# Patient Record
Sex: FEMALE | Race: AMERICAN INDIAN OR ALASKA NATIVE
[De-identification: names, ages, dates, MRNs, and addresses within clinical notes are randomized per-mention and may not be internally consistent; named-entity substitution may affect disease eponyms.]

---

## 2017-08-14 ENCOUNTER — HOSPITAL ENCOUNTER (EMERGENCY)
Dept: HOSPITAL 43 - DL.ED | Age: 52
Discharge: HOME | End: 2017-08-14
Payer: MEDICAID

## 2017-08-14 VITALS — SYSTOLIC BLOOD PRESSURE: 97 MMHG | DIASTOLIC BLOOD PRESSURE: 66 MMHG

## 2017-08-14 DIAGNOSIS — S60.221A: Primary | ICD-10-CM

## 2017-08-14 DIAGNOSIS — E11.9: ICD-10-CM

## 2017-08-14 DIAGNOSIS — Z79.899: ICD-10-CM

## 2017-08-14 DIAGNOSIS — S80.211A: ICD-10-CM

## 2017-08-14 DIAGNOSIS — Z79.84: ICD-10-CM

## 2017-08-14 DIAGNOSIS — Z90.49: ICD-10-CM

## 2017-08-14 DIAGNOSIS — Z98.890: ICD-10-CM

## 2017-08-14 DIAGNOSIS — F17.210: ICD-10-CM
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
## 2017-08-14 PROCEDURE — 99284 EMERGENCY DEPT VISIT MOD MDM: CPT

## 2017-08-14 PROCEDURE — 73130 X-RAY EXAM OF HAND: CPT

## 2017-08-14 NOTE — CR
Clinical history: 51-year-old female injured right hand (fall).

 

Interpretation: Chronic arthritic degenerative changes involving the first metacarpophalangeal and a
ll interphalangeal/DIP joints.

 

No sign of right hand fracture or dislocation but faint transverse lucent line across the "waist" of
 the carpal navicular bone that could represent occult nondisplaced fracture. Point tenderness anato
tamara snuffbox? No other fracture/dislocation of the wrist.

 

CONCLUSION: Subtle observation navicular bone left wrist (see above). No fracture or dislocation holt
dDev Osteoarthritis.

## 2017-08-14 NOTE — EDM.PDOC
ED HPI GENERAL MEDICAL PROBLEM





- General


Chief Complaint: Upper Extremity Injury/Pain


Stated Complaint: [id number] FELL IN ALLEY


Time Seen by Provider: 08/14/17 16:09


Source of Information: Reports: Patient


History Limitations: Reports: No Limitations





- History of Present Illness


INITIAL COMMENTS - FREE TEXT/NARRATIVE: 





This 52 yo female patient reports to the ED with pain in her right hand over 

the 3-5 MCP joints and some left sided posterior neck pain. The patient reports 

she was walking in an alley when she tripped and fell. The patient reports no 

loss of consciousness before, during or after the fall. The patient reports she 

has been attempting to rest and ice the area, but she continues to be unable to 

make a fist. The patient also has an abrasion to the right knee. 


Onset: Today


Duration: Hour(s):, Constant


Location: Reports: Neck (left lateral posterior neck), Upper Extremity, Right (3

-5 MCP), Lower Extremity, Right (knee abrsion)


Quality: Reports: Ache, Dull


Severity: Moderate


Improves with: Reports: None


Worsens with: Reports: None


Context: Reports: Other (fall)


Associated Symptoms: Reports: No Other Symptoms


  ** Right Hand


Pain Score (Numeric/FACES): 8





- Related Data


 Allergies











Allergy/AdvReac Type Severity Reaction Status Date / Time


 


No Known Allergies Allergy   Verified 06/05/15 16:24











Home Meds: 


 Home Meds





Calcium Carbonate [Calcium] 1 tab PO DAILY 06/05/15 [History]


Lisinopril 2.5 mg PO DAILY 06/05/15 [History]


metFORMIN [Glucophage] 1,000 mg PO BID 06/05/15 [History]











Past Medical History


HEENT History: Reports: None


Cardiovascular History: Reports: None


Respiratory History: Reports: None


Gastrointestinal History: Reports: None


Genitourinary History: Reports: None


OB/GYN History: Reports: None


Neurological History: Reports: None


Psychiatric History: Reports: Depression


Endocrine/Metabolic History: Reports: Diabetes, Type II


Hematologic History: Reports: None


Immunologic History: Reports: None


Oncologic (Cancer) History: Reports: None


Dermatologic History: Reports: None





- Infectious Disease History


Infectious Disease History: Reports: None





- Past Surgical History


HEENT Surgical History: Reports: None


Cardiovascular Surgical History: Reports: None


Respiratory Surgical History: Reports: None


GI Surgical History: Reports: Cholecystectomy


Female  Surgical History: Reports: None


Endocrine Surgical History: Reports: None


Neurological Surgical History: Reports: None


Musculoskeletal Surgical History: Reports: Other (See Below)


Other Musculoskeletal Surgeries/Procedures:: ankle surgery X2





Social & Family History





- Tobacco Use


Smoking Status *Q: Current Some Day Smoker


Years of Tobacco use: 33


Packs/Tins Daily: 0.1





- Caffeine Use


Caffeine Use: Reports: Coffee, Energy Drinks, Soda, Tea





- Alcohol Use


Days Per Week of Alcohol Use: 0





- Recreational Drug Use


Recreational Drug Use: Yes


Drug Use in Last 12 Months: Yes


Recreational Drug Type: Reports: Marijuana/Hashish


Recreational Drug Use Frequency: Not Used In Over 4 Months





- Living Situation & Occupation


Living situation: Reports: Single, with Family


Occupation: Employed





Review of Systems





- Review of Systems


Review Of Systems: ROS reveals no pertinent complaints other than HPI.





ED EXAM, GENERAL





- Physical Exam


Exam: See Below


Exam Limited By: No Limitations


General Appearance: Alert, WD/WN, Moderate Distress


Eye Exam: Bilateral Eye: EOMI, Normal Inspection, PERRL


Ears: Normal External Exam, Normal Canal, Hearing Grossly Normal, Normal TMs


Nose: Normal Inspection, Normal Mucosa, No Blood


Throat/Mouth: Normal Inspection, Normal Lips, Normal Teeth, Normal Gums, Normal 

Oropharynx, Normal Voice, No Airway Compromise


Head: Atraumatic, Normocephalic


Neck: Normal Inspection, Supple, Full Range of Motion, Tender Lateral (left 

posterior lateral )


Respiratory/Chest: No Respiratory Distress, Lungs Clear, Normal Breath Sounds, 

No Accessory Muscle Use, Chest Non-Tender


Cardiovascular: Normal Peripheral Pulses, Regular Rate, Rhythm, No Edema, No 

Gallop, No JVD, No Murmur, No Rub


GI/Abdominal: Normal Bowel Sounds, Soft, Non-Tender, No Organomegaly, No 

Distention, No Abnormal Bruit, No Mass


 (Female) Exam: Deferred


Rectal (Female) Exam: Deferred


Back Exam: Normal Inspection, Full Range of Motion


Extremities: Arm Pain (right hand pain with swelling), Other (right knee 

abrasion)


Neurological: Alert, Oriented, CN II-XII Intact, Normal Cognition, Normal Gait, 

Normal Reflexes, No Motor/Sensory Deficits


Psychiatric: Normal Affect, Normal Mood


Skin Exam: Warm, Dry, Intact, Normal Color, No Rash


Lymphatic: No Adenopathy





Course





- Vital Signs


Last Recorded V/S: 





 Last Vital Signs











Temp  36.4 C   08/14/17 15:47


 


Pulse  55 L  08/14/17 15:47


 


Resp  16   08/14/17 15:47


 


BP  97/66   08/14/17 15:47


 


Pulse Ox  98   08/14/17 15:47














- Orders/Labs/Meds


Orders: 





 Active Orders 24 hr











 Category Date Time Status


 


 Hand Comp Min 3V Rt [CR] Urgent Exams  08/14/17 15:54 Taken














Departure





- Departure


Time of Disposition: 16:24


Disposition: Home, Self-Care 01


Condition: Fair


Clinical Impression: 


Contusion of right hand


Qualifiers:


 Encounter type: initial encounter Qualified Code(s): S60.221A - Contusion of 

right hand, initial encounter








- Discharge Information


Instructions:  Hand Contusion, Easy-to-Read


Forms:  ED Department Discharge


Care Plan Goals: 


The patient was advised of the examination and x-ray results during the visit. 

The patient was placed in a splint for her right hand. The patient should rest, 

ice and elevate the right hand over the next 48 hours. The patient was given a 

dose of Naproxen while in the ED. If the patient has any additional symptoms or 

concerns, the patient should follow-up with her primary care facility or return 

to the ED. 





- My Orders


Last 24 Hours: 





My Active Orders





08/14/17 15:54


Hand Comp Min 3V Rt [CR] Urgent 














- Assessment/Plan


Last 24 Hours: 





My Active Orders





08/14/17 15:54


Hand Comp Min 3V Rt [CR] Urgent

## 2018-07-08 ENCOUNTER — HOSPITAL ENCOUNTER (EMERGENCY)
Dept: HOSPITAL 43 - DL.ED | Age: 53
Discharge: HOME | End: 2018-07-08
Payer: MEDICAID

## 2018-07-08 VITALS — SYSTOLIC BLOOD PRESSURE: 110 MMHG | DIASTOLIC BLOOD PRESSURE: 54 MMHG

## 2018-07-08 DIAGNOSIS — M19.071: Primary | ICD-10-CM

## 2018-07-08 DIAGNOSIS — Z79.899: ICD-10-CM

## 2018-07-08 DIAGNOSIS — M77.9: ICD-10-CM

## 2018-07-08 DIAGNOSIS — Z79.84: ICD-10-CM

## 2018-07-08 DIAGNOSIS — E11.9: ICD-10-CM

## 2018-07-08 NOTE — EDM.PDOC
ED HPI GENERAL MEDICAL PROBLEM





- General


Chief Complaint: Lower Extremity Injury/Pain


Stated Complaint: [id number] SPURS ON RIGHT FOOT CANT WALK ON IT


Time Seen by Provider: 07/08/18 10:27


Source of Information: Reports: Patient, Old Records, RN, RN Notes Reviewed


History Limitations: Reports: No Limitations





- History of Present Illness


INITIAL COMMENTS - FREE TEXT/NARRATIVE: 


Pt presents to ER from home with c/o right ankle pain, and cannot walk on it. 

Pt has hx of chronic rt ankle pain with known bone spurs. She has [facility]NE [facility], and has an MRI of her ankle scheduled for tomorrow

, but states she couldn't wait due to the pain.





Duration: Chronic, Getting Worse


Location: Reports: Lower Extremity, Right


Quality: Reports: Ache, Same as Previous Episode


Severity: Severe


Improves with: Reports: None


Worsens with: Reports: Other (weight bearing), Movement


Associated Symptoms: Reports: No Other Symptoms


Treatments PTA: Reports: Acetaminophen, NSAIDS, Other Medication(s)


  ** Right Feet


Pain Score (Numeric/FACES): 10





- Related Data


 Allergies











Allergy/AdvReac Type Severity Reaction Status Date / Time


 


No Known Allergies Allergy   Verified 07/08/18 10:26











Home Meds: 


 Home Meds





Calcium Carbonate [Calcium] 1 tab PO DAILY 06/05/15 [History]


Lisinopril 2.5 mg PO DAILY 06/05/15 [History]


metFORMIN [Glucophage] 1,000 mg PO BID 06/05/15 [History]


Etodolac 1 tab PO BID 07/08/18 [History]


FLUoxetine HCl [Fluoxetine HCl] 20 mg PO DAILY 07/08/18 [History]


Glimepiride [Amaryl] 2 mg PO DAILY 07/08/18 [History]


Hydrocodone/Acetaminophen [Hydrocodon-Acetaminophen 5-325] 1 each PO ASDIRECTED 

PRN 07/08/18 [History]


SitaGLIPtin [Januvia] 50 mg PO DAILY 07/08/18 [History]


atorvaSTATin [Lipitor] 20 mg PO DAILY 07/08/18 [History]











Past Medical History


HEENT History: Reports: None


Cardiovascular History: Reports: None


Respiratory History: Reports: None


Gastrointestinal History: Reports: None


Genitourinary History: Reports: None


OB/GYN History: Reports: None


Neurological History: Reports: None


Psychiatric History: Reports: Depression


Endocrine/Metabolic History: Reports: Diabetes, Type II


Hematologic History: Reports: None


Immunologic History: Reports: None


Oncologic (Cancer) History: Reports: None


Dermatologic History: Reports: None





- Infectious Disease History


Infectious Disease History: Reports: None





- Past Surgical History


HEENT Surgical History: Reports: None


Cardiovascular Surgical History: Reports: None


Respiratory Surgical History: Reports: None


GI Surgical History: Reports: Cholecystectomy


Female  Surgical History: Reports: None


Endocrine Surgical History: Reports: None


Neurological Surgical History: Reports: None


Musculoskeletal Surgical History: Reports: Other (See Below)


Other Musculoskeletal Surgeries/Procedures:: ankle surgery X2





Social & Family History





- Family History


Family Medical History: Noncontributory





- Caffeine Use


Caffeine Use: Reports: Coffee, Energy Drinks, Soda, Tea





- Living Situation & Occupation


Living situation: Reports: Single, with Family


Occupation: Employed





Review of Systems





- Review of Systems


Review Of Systems: ROS reveals no pertinent complaints other than HPI.





ED EXAM, GENERAL





- Physical Exam


Exam: See Below


Exam Limited By: No Limitations


General Appearance: Alert, WD/WN, No Apparent Distress


Throat/Mouth: Normal Voice


Head: Atraumatic, Normocephalic


Respiratory/Chest: No Respiratory Distress


Cardiovascular: Normal Peripheral Pulses


Extremities: No Pedal Edema, Normal Capillary Refill, Joint Swelling (Rt ankle)

, Limited Range of Motion (Rt ankle due to pain)


Neurological: Alert, Oriented, Normal Cognition, No Motor/Sensory Deficits


Psychiatric: Normal Mood


Skin Exam: Warm, Dry, Intact, Normal Color, No Rash





Course





- Vital Signs


Last Recorded V/S: 


 Last Vital Signs











Temp  36.5 C   07/08/18 10:22


 


Pulse  61   07/08/18 10:22


 


Resp  16   07/08/18 10:22


 


BP  110/54 L  07/08/18 10:22


 


Pulse Ox  98   07/08/18 10:22














- Orders/Labs/Meds


Orders: 


 Active Orders 24 hr











 Category Date Time Status


 


 DME for Discharge [COMM] Routine Oth  07/08/18 10:51 Ordered














Departure





- Departure


Time of Disposition: 10:53


Disposition: Home, Self-Care 01


Condition: Good


Clinical Impression: 


 Bone spur of right ankle





Degenerative arthritis of right ankle


Qualifiers:


 Osteoarthritis type: unspecified Qualified Code(s): M19.071 - Primary 

osteoarthritis, right ankle and foot








- Discharge Information


Instructions:  Crutch Use, Adult, Easy-to-Read, Osteoarthritis


Forms:  ED Department Discharge


Additional Instructions: 


Use crutches to be non-weight bearing, or partial weight bearing on the right 

ankle.


Rest, ice, and elevate the right ankle.


Follow up for MRI and with Dr. WAYNE Frederick as planned.





- My Orders


Last 24 Hours: 


My Active Orders





07/08/18 10:51


DME for Discharge [COMM] Routine 














- Assessment/Plan


Last 24 Hours: 


My Active Orders





07/08/18 10:51


DME for Discharge [COMM] Routine

## 2018-09-27 ENCOUNTER — HOSPITAL ENCOUNTER (OUTPATIENT)
Dept: HOSPITAL 43 - DL.SDS | Age: 53
Discharge: HOME | End: 2018-09-27
Attending: PODIATRIST
Payer: MEDICAID

## 2018-09-27 VITALS — SYSTOLIC BLOOD PRESSURE: 126 MMHG | DIASTOLIC BLOOD PRESSURE: 70 MMHG

## 2018-09-27 DIAGNOSIS — M25.571: ICD-10-CM

## 2018-09-27 DIAGNOSIS — Z79.899: ICD-10-CM

## 2018-09-27 DIAGNOSIS — F17.210: ICD-10-CM

## 2018-09-27 DIAGNOSIS — S96.811A: ICD-10-CM

## 2018-09-27 DIAGNOSIS — M25.371: Primary | ICD-10-CM

## 2018-09-27 DIAGNOSIS — E11.9: ICD-10-CM
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
## 2018-09-27 DIAGNOSIS — M76.821: ICD-10-CM

## 2018-09-27 DIAGNOSIS — G89.29: ICD-10-CM

## 2018-09-27 DIAGNOSIS — Z79.84: ICD-10-CM

## 2018-09-27 DIAGNOSIS — M24.171: ICD-10-CM

## 2018-09-27 DIAGNOSIS — F32.9: ICD-10-CM

## 2018-09-27 PROCEDURE — 27698 REPAIR OF ANKLE LIGAMENT: CPT

## 2018-09-27 PROCEDURE — 29891 ARTHR ANK OSTCHN DF TAL&/TIB: CPT

## 2018-09-27 PROCEDURE — 82962 GLUCOSE BLOOD TEST: CPT

## 2018-09-27 PROCEDURE — C1713 ANCHOR/SCREW BN/BN,TIS/BN: HCPCS

## 2018-09-27 NOTE — PCM.OPNOTE
- General Post-Op/Procedure Note


Date of Surgery/Procedure: 09/27/18


Operative Procedure(s): RIght ankle arthroscopy with debridement and 

subchondral drilling of osteochondral defect of talus, lateral ankle 

stabilization procedure, posterior tibial tendon repair.


Pre Op Diagnosis: right ankle pain with osteochondral defect of talus, lateral 

ankle instability, posterior tibial tendon tear PTTD stage 1.


Post-Op Diagnosis: mak


Anesthesia Technique: General LMA


Primary Surgeon: Virginia Frederick


Anesthesia Provider: Del Hammond


EBL in mLs: 15


Complications: none


Condition: Good


Free Text/Narrative:: 





Pt tolerated procedure well and was transported to recovery with vascular 

status intact to right LE. [place] 3.5 corkscrew anchor to distal fibula. well 

padded L&U split applied with foot in slight eversion.

## 2018-09-28 NOTE — OR
DATE:  09/27/2018

 

PREOPERATIVE DIAGNOSES:

1. Right chronic ankle pain.

2. Right ankle instability.

3. Right osteochondral defect of the talus.

4. Right foot posterior tibial tendon tear with posterior tibial tendon

    dysfunction, stage I.

 

POSTOPERATIVE DIAGNOSES:

1. Right chronic ankle pain.

2. Right ankle instability.

3. Right osteochondral defect of the talus.

4. Right foot posterior tibial tendon tear with posterior tibial tendon

    dysfunction, stage I.

 

PROCEDURES PERFORMED:

1. Right ankle arthroscopy with debridement and subchondral

    drilling/microfracture of osteochondral defect of the talus.

2. Right lateral ankle stabilization/Brostrom Haines procedure.

3. Right foot posterior tibial tendon tear repair.

 

ANESTHESIA:  General LMA with preoperative local block of 10 mL 1:1 mixture of

1% lidocaine plain and 0.5% Marcaine plain.

 

TOURNIQUET TIME:  Pneumatic thigh tourniquet, 62 minutes.  This was then let

down for greater than 15 minutes and let back up for 58 minutes.

 

ESTIMATED BLOOD LOSS:  Minimal.

 

SPECIMEN:  None.

 

COMPLICATIONS:  None.

 

INDICATIONS:  Gayle is a 52-year-old female who presents with right foot and

ankle pain.  She has been having this pain for over 2 years now.  She has pain

at the ankle joint which feels deep in the joint, also at the lateral outside

ankle and inside of the ankle.  She has had multiple different injuries to this

ankle and also reports that she sprains this ankle at least twice a week.  She

recently just had a new inversion-type injury and reports instability symptoms,

where it is hard for her to walk on any type of uneven surface.  We have tried

ankle braces, immobilization in a Cam boot, physical therapy, and injection into

the ankle joint with no relief.  She has failed conservative options.  She does

have a mild pes planus foot structure on stance; but she has a neutral heel on

stance, is not everted, and has induration with heel lift; and she does have

somewhat of an arch still.  She has pain along that posterior tibial tendon as

well after an injury.  She stated this is when it started.

 

X-rays reveal ankle with no signs of fracture, arthritic changes including joint

space narrowing and periarticular spurring present.  MRI of the right ankle

reveals a tear of the posterior tibial tendon at the area just distal to the

medial malleolus with synovitis present to the tendon.  There is a tear of the

ATFL ligament with increased signal to the ankle joint, osteochondral defect of

the lateral central talus.  There is bone bruising at the lateral talus and

lateral malleolus without any signs of fracture, bone spurring at the anterior

tibial.

 

The patient voiced good understanding of the proposed procedure and possible

complications and elects to have surgery at this time.

 

DESCRIPTION OF THE PROCEDURE:  The patient was taken to the operating room lying

in the supine position.  After adequate anesthesia induction as described above,

the right foot and ankle were prepped and draped in the usual sterile fashion.

A pneumatic thigh tourniquet was inflated to 240 mmHg.  Attention was then

directed to the right ankle, and the dorsal cutaneous nerve was attempted to be

palpated and marked out on the lateral ankle.  The saphenous neurovascular

bundle was then palpated, and a small stab incision was made just medial to the

anterior tibial tendon being careful to avoid the saphenous neurovascular

bundle.  The ankle distractor was applied, and a 2.7 ankle scope was then

inserted into the ankle capsule.  At this point, it was noted that it was not

able to be seen through the scope because the scope was bent.  We had to get a

new scope which had a small scratch in it but ended up working.  A small stab

incision was made at the lateral dorsal ankle being careful to avoid the

intermediate cutaneous nerve.  The scope was used to inspect the ankle.  There

was noted to be a moderate amount of chondromalacia throughout the entire ankle.

There was a significant amount of synovitis at the dorsal ankle as well as at

the dorsal lateral ankle.  This was debrided with a shaver.  There was also

noted to be a bone spur at the dorsal tibia which was also debrided and taken

down with a shaver.  The lateral ankle gutter was also debrided with the shaver.

There was noted to be an osteochondral defect at the lateral talar dome, and

this was debrided with a curette to get all loose cartilage.  A [facility]

microfracture instrument was then used to microfracture the area.  There were no

other defects noted throughout the ankle joint.  The ankle scope and shaver were

then removed from the ankle joint, and the incisions were repaired with 4-0

nylon.  Attention was then directed over to the medial ankle/foot along the

posterior tibial tendon.  An approximately 8 cm curvilinear incision was made

overlying the tendon course.  Sharp and blunt dissection was performed down to

the level of the posterior tibial tendon sheath.  The sheath was opened, and the

tendon was exposed.  There was noted to be a tear to the posterior tibial tendon

measuring approximately 2.5 cm in length.  The bad tendon was excised, and the

tendon was re-tubularized with a 3-0 nylon suture, and the tendon tear was

repaired.  The rest of the tendon appeared healthy.  The area was irrigated with

copious amounts of sterile saline.  The sheath and retinaculum were repaired

with 3-0 Vicryl.  The skin incision site was then closed with 4-0 nylon.

Attention was then directed to the distal fibula, where a curvilinear incision

was made approximately 5 cm in length at the distal anterior aspect of the

fibula.  Sharp and blunt dissection was performed down to the level of the ankle

joint capsule and inferior extensor retinaculum.  Care was taken to avoid all

neurovascular structures and cauterize all bleeders.  A #15 blade was used to

incise anterior and lateral ankle capsule and ATFL down to the level of the

peroneal tendon sheath.  The tendon was visualized at that time and looked

healthy.  Attention was directed back to the anterior lateral ankle, and

dissection was made in the layer  the extensor retinaculum and the

anterior joint capsule.  The periosteum was elevated from the distal fibula, and

at this point, there was noted to be a large bone fragment which was excised

from the area.  A curette and a rongeur were used to create a gutter running

transversely along the tip of the distal fibula down to the good bleeding bone.

A [place] 3.5 corkscrew anchor was then inserted from distal to proximal

centered at the distal tip of the fibula.  I was careful not to enter the ankle

joint or the peroneal groove.  The suture from the bone anchor was then used to

suture the ATFL and anterior capsule and secured them down to the fibula with

the ankle held in the everted position.  The suture was then again used to

suture down the extensor retinaculum over the anterior ankle capsule to the

fibula for reinforcement of the repair, and the remainder of the suture was used

to secure the periosteum to the inferior retinaculum in a pants-over-vest

fashion with the foot held in the everted position.  The ankle was tested and

noted to be stable to inversion and talar tilt at this time.  The area was

irrigated with copious amounts of sterile saline.  Deep closure was completed

with 3-0 Vicryl, and skin closure was completed with 4-0 nylon.  The area was

dressed with Xeroform to the incision site, fluffs, Webril, and a well-padded L

and U splint with the foot in just slight eversion.  The patient tolerated the

procedure well and transferred to recovery with good vascular status intact to

the right foot and ankle.  The patient was then discharged to home when she met

hospital discharge requirements.

 

DD:  09/28/2018 09:31:23

DT:  09/28/2018 13:30:56

[facility]

Job #:  [id number]
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
